# Patient Record
Sex: FEMALE | HISPANIC OR LATINO | Employment: FULL TIME | URBAN - NONMETROPOLITAN AREA
[De-identification: names, ages, dates, MRNs, and addresses within clinical notes are randomized per-mention and may not be internally consistent; named-entity substitution may affect disease eponyms.]

---

## 2022-02-07 ENCOUNTER — NON-PROVIDER VISIT (OUTPATIENT)
Dept: URGENT CARE | Facility: PHYSICIAN GROUP | Age: 57
End: 2022-02-07

## 2022-02-07 ENCOUNTER — APPOINTMENT (OUTPATIENT)
Dept: RADIOLOGY | Facility: IMAGING CENTER | Age: 57
End: 2022-02-07
Attending: FAMILY MEDICINE
Payer: COMMERCIAL

## 2022-02-07 ENCOUNTER — OCCUPATIONAL MEDICINE (OUTPATIENT)
Dept: URGENT CARE | Facility: PHYSICIAN GROUP | Age: 57
End: 2022-02-07
Payer: COMMERCIAL

## 2022-02-07 VITALS
HEIGHT: 59 IN | TEMPERATURE: 97.3 F | RESPIRATION RATE: 12 BRPM | SYSTOLIC BLOOD PRESSURE: 102 MMHG | WEIGHT: 125 LBS | HEART RATE: 72 BPM | DIASTOLIC BLOOD PRESSURE: 66 MMHG | BODY MASS INDEX: 25.2 KG/M2 | OXYGEN SATURATION: 99 %

## 2022-02-07 DIAGNOSIS — S86.912A STRAIN OF LEFT KNEE, INITIAL ENCOUNTER: ICD-10-CM

## 2022-02-07 DIAGNOSIS — Z02.1 PRE-EMPLOYMENT DRUG SCREENING: ICD-10-CM

## 2022-02-07 DIAGNOSIS — M23.92 INTERNAL DERANGEMENT OF LEFT KNEE: ICD-10-CM

## 2022-02-07 DIAGNOSIS — Z02.89 ENCOUNTER FOR PHYSICAL EXAMINATION RELATED TO EMPLOYMENT: ICD-10-CM

## 2022-02-07 LAB
AMP AMPHETAMINE: NORMAL
BAR BARBITURATES: NORMAL
BREATH ALCOHOL COMMENT: NORMAL
BZO BENZODIAZEPINES: NORMAL
COC COCAINE: NORMAL
INT CON NEG: NORMAL
INT CON POS: NORMAL
MDMA ECSTASY: NORMAL
MET METHAMPHETAMINES: NORMAL
MTD METHADONE: NORMAL
OPI OPIATES: NORMAL
OXY OXYCODONE: NORMAL
PCP PHENCYCLIDINE: NORMAL
POC BREATHALIZER: 0 PERCENT (ref 0–0.01)
POC URINE DRUG SCREEN OCDRS: YELLOW
THC: NORMAL

## 2022-02-07 PROCEDURE — 80305 DRUG TEST PRSMV DIR OPT OBS: CPT | Performed by: FAMILY MEDICINE

## 2022-02-07 PROCEDURE — 82075 ASSAY OF BREATH ETHANOL: CPT | Performed by: FAMILY MEDICINE

## 2022-02-07 PROCEDURE — 73562 X-RAY EXAM OF KNEE 3: CPT | Mod: TC,FY,LT | Performed by: RADIOLOGY

## 2022-02-07 PROCEDURE — 99203 OFFICE O/P NEW LOW 30 MIN: CPT | Performed by: FAMILY MEDICINE

## 2022-02-07 NOTE — LETTER
"EMPLOYEE’S CLAIM FOR COMPENSATION/ REPORT OF INITIAL TREATMENT  FORM C-4    EMPLOYEE’S CLAIM - PROVIDE ALL INFORMATION REQUESTED   First Name  Tara Last Name  Aaron Birthdate                    1965                Sex  female Claim Number (Insurer’s Use Only)    Home Address  [address] Age  57 y.o. Height  1.499 m (4' 11\") Weight  56.7 kg (125 lb) N     [facility] Zip  [address] Telephone  [phone] (home)    Mailing Address  [address] [facility] Zip  [address] Primary Language Spoken  Malagasy    Insurer  N/A Third-Party   [employer]   Employee's Occupation (Job Title) When Injury or Occupational Disease Occurred      Employer's Name/Company Name  [employer]  Telephone  [phone]    Office Mail Address (Number and Street)   [address]  Zip  [address]    Date of Injury  1/28/2022               Hours Injury  11:30 AM Date Employer Notified  1/28/2022 Last Day of Work after Injury     or Occupational Disease  2/7/2022 Supervisor to Whom Injury     Reported  nemo    Address or Location of Accident (if applicable)  [[address] ]   What were you doing at the time of accident? (if applicable)  moving pallets     How did this injury or occupational disease occur? (Be specific an answer in detail. Use additional sheet if necessary)  i was pushing the pallet umesh to move pallets, i felt my knee pop and my calve start to hurt    If you believe that you have an occupational disease, when did you first have knowledge of the disability and it relationship to your employment?  na  Witnesses to the Accident  tara       Nature of Injury or Occupational Disease  Workers' Compensation  Part(s) of Body Injured or Affected  Knee (L), Lower Leg (L),     I certify that the above is true and correct to the best of my knowledge and that I have provided " this information in order to obtain the benefits of [place]  I hereby authorize any physician, chiropractor, surgeon, practitioner, or other person, any hospital, including [facility] hospital, any medical service organization, any insurance company, or other institution or organization to release to each other, any medical or other information, including benefits paid or payable, pertinent to this injury or disease, except information relative to diagnosis, treatment and/or counseling for AIDS, psychological conditions, alcohol or controlled substances, for which I must give specific authorization.  A Photostat of this authorization shall be as valid as the original.     Date   Place Employee’s Original or  *Electronic Signature   THIS REPORT MUST BE COMPLETED AND MAILED WITHIN 3 WORKING DAYS OF TREATMENT   Place  [facility]  Name of Facility  [place]   Date  2/7/2022 Diagnosis and Description of Injury or Occupational Disease  (S86.912A) Strain of left knee, initial encounter  (M23.92) Internal derangement of left knee Is there evidence the injured employee was under the influence of alcohol and/or another controlled substance at the time of accident?  ? No ? Yes (if yes, please explain)    Hour  12:07 PM   Diagnoses of Strain of left knee, initial encounter and Internal derangement of left knee were pertinent to this visit. No   Treatment  Xray, OTC Motrin, knee brace and crutches and work restrictions and referral to [facility]   Have you advised the patient to remain off work five days or     more?    X-Ray Findings      ? Yes Indicate dates:   From   To      From information given by the employee, together with medical evidence, can        you directly connect this injury or occupational disease as job incurred?  Yes ? No If no, is the injured employee capable of:  ?  "full duty  No ? modified duty  Yes   Is additional medical care by a physician indicated?  Yes If Modified Duty, Specify any Limitations / Restrictions  Sedentary duty w/ standing/walking only as tolerated    Do you know of any previous injury or disease contributing to this condition or occupational disease?  ? Yes ? No (Explain if yes)                          No   Date  2/7/2022 Print Health Care Provider's   Bert Rondon M.D. I certify the employer’s copy of  this form was mailed on:   Address  [address] Insurer’s Use Only     [facility]  [address]    Provider’s Tax ID Number  [id number] Telephone  Dept: [phone]             Health Care Provider’s Original or Electronic Signature  e-SignBERT RONDON M.D. Degree (MD,DO, DC,PARuthC,APRN)         * Complete and attach Release of Information (Form C-4A) when injured employee signs C-4 Form electronically  ORIGINAL - TREATING HEALTHCARE PROVIDER PAGE 2 - INSURER/TPA PAGE 3 - EMPLOYER PAGE 4 - EMPLOYEE             Form C-4 (rev.08/21)           BRIEF DESCRIPTION OF RIGHTS AND BENEFITS  (Pursuant to [place] 616C.050)    Notice of Injury or Occupational Disease (Incident Report Form C-1): If an injury or occupational disease (OD) arises out of and in the course of employment, you must provide written notice to your employer as soon as practicable, but no later than 7 days after the accident or OD. Your employer shall maintain a sufficient supply of the required forms.    Claim for Compensation (Form C-4): If medical treatment is sought, the form C-4 is available at the place of initial treatment. A completed \"Claim for Compensation\" (Form C-4) must be filed within 90 days after an accident or OD. The treating physician or chiropractor must, within 3 working days after treatment, complete and mail to the employer, the employer's insurer and third-party , the Claim for Compensation.    Medical Treatment: If you require " medical treatment for your on-the-job injury or OD, you may be required to select a physician or chiropractor from a list provided by your workers’ compensation insurer, if it has contracted with an Organization for Managed Care (MCO) or Preferred Provider Organization (PPO) or providers of health care. If your employer has not entered into a contract with an MCO or PPO, you may select a physician or chiropractor from the Panel of Physicians and Chiropractors. Any medical costs related to your industrial injury or OD will be paid by your insurer.    Temporary Total Disability (TTD): If your doctor has certified that you are unable to work for a period of at least 5 consecutive days, or 5 cumulative days in a 20-day period, or places restrictions on you that your employer does not accommodate, you may be entitled to TTD compensation.    Temporary Partial Disability (TPD): If the wage you receive upon reemployment is less than the compensation for TTD to which you are entitled, the insurer may be required to pay you TPD compensation to make up the difference. TPD can only be paid for a maximum of 24 months.    Permanent Partial Disability (PPD): When your medical condition is stable and there is an indication of a PPD as a result of your injury or OD, within 30 days, your insurer must arrange for an evaluation by a rating physician or chiropractor to determine the degree of your PPD. The amount of your PPD award depends on the date of injury, the results of the PPD evaluation, your age and wage.    Permanent Total Disability (PTD): If you are medically certified by a treating physician or chiropractor as permanently and totally disabled and have been granted a PTD status by your insurer, you are entitled to receive monthly benefits not to exceed 66 2/3% of your average monthly wage. The amount of your PTD payments is subject to reduction if you previously received a lump-sum PPD award.    Vocational Rehabilitation  Services: You may be eligible for vocational rehabilitation services if you are unable to return to the job due to a permanent physical impairment or permanent restrictions as a result of your injury or occupational disease.    Transportation and Per Varun Reimbursement: You may be eligible for travel expenses and per varun associated with medical treatment.    Reopening: You may be able to reopen your claim if your condition worsens after claim closure.     Appeal Process: If you disagree with a written determination issued by the insurer or the insurer does not respond to your request, you may appeal to the Department of Administration, , by following the instructions contained in your determination letter. You must appeal the determination within 70 days from the date of the determination letter at [address], [address], [place], [place] [address], or [address], [place], [place] [address]. If you disagree with the  decision, you may appeal to the Department of Administration, . You must file your appeal within 30 days from the date of the  decision letter at [address], [address], [place], [place] [address], or [address], [place], [place] [address]. If you disagree with a decision of an , you may file a petition for judicial review with the District Court. You must do so within 30 days of the Appeal Officer’s decision. You may be represented by an  at your own expense or you may contact the [facility] for possible representation.    [place]  for Injured Workers (NAIW): If you disagree with a  decision, you may request that NAIW represent you without charge at an  Hearing. For information regarding denial of benefits, you may contact the [facility] at: [address], [address], [place] [address], [phone], or [address],  [address], [place] [address], [phone]    To File a Complaint with the Division: If you wish to file a complaint with the  of the Division of Industrial Relations (DIR),  please contact the Workers’ Compensation Section, [address], [address], [place], [place] [address], telephone [phone], or [address], [place], [place] [address], telephone [phone].    For assistance with Workers’ Compensation Issues: You may contact the [facility] Office for Consumer Health Assistance, [address], [place], [place] [address], Toll Free [phone], Web site: http://[facility].[place].gov/Programs/WERNER E-mail: [email or web address]              __________________________________________________________________                                    _________________            Employee Name / Signature                                                                                                                            Date                                                                                                                                                                                                                              D-2 (rev. 10/20)

## 2022-02-07 NOTE — PROGRESS NOTES
"Subjective     Alivia Walker is a 57 y.o. female who presents with Knee Pain (right knee) and Leg Pain (right calf and thigh pt states)      DOI 1/28/22  SUSAN: walking at work and felt a sudden pain to back of and side of knee. Now hurts to but weight on it or walk.      HPI    Review of Systems   Musculoskeletal: Positive for joint pain.   All other systems reviewed and are negative.             Objective     /66   Pulse 72   Temp 36.3 °C (97.3 °F)   Resp 12   Ht 1.499 m (4' 11\")   Wt 56.7 kg (125 lb)   SpO2 99%   BMI 25.25 kg/m²      Physical Exam  Vitals and nursing note reviewed.   Constitutional:       General: She is not in acute distress.     Appearance: Normal appearance. She is well-developed.   HENT:      Head: Normocephalic.   Cardiovascular:      Heart sounds: Normal heart sounds. No murmur heard.      Pulmonary:      Effort: Pulmonary effort is normal. No respiratory distress.   Musculoskeletal:        Legs:       Comments: Lt knee: + effusion, no discoloration or wounds. Some TTP medial joint line and posterior knee. Good SROM. No calf swelling/TTP   Neurological:      Mental Status: She is alert.      Motor: No abnormal muscle tone.   Psychiatric:         Mood and Affect: Mood normal.         Behavior: Behavior normal.                             Assessment & Plan       1. Strain of left knee, initial encounter  DX-KNEE 3 VIEWS LEFT    Referral to Occupational Medicine   2. Internal derangement of left knee       Xray: no acute findings by MY READ. RADIOLOGY READ PENDING.     Sedentary duty w/ standing/walking only as tolerated     Knee brace/crutches    KEANU MCPHERSON Motrin 3x/day x 5 days    Recheck [facility] in 5-7 days     Today's radiology imaging personally reviewed by me today on day of visit and Radiology readings reviewed and discussed w/ patient today.           "

## 2022-02-07 NOTE — LETTER
[facility] [address] [place] [address]  Phone:  [phone] - Fax:  [phone]   [facility] Progress Report and Disability Certification  Date of Service: 2/7/2022   No Show:  No  Date / Time of Next Visit: 2/11/2022 Jerry Stauffer   Claim Information   Patient Name: Alivia Walker  Claim Number:     Employer: [employer]  Date of Injury: 1/28/2022     Insurer / TPA: [employer]  ID / SSN:     Occupation:   Diagnosis: Diagnoses of Strain of left knee, initial encounter and Internal derangement of left knee were pertinent to this visit.    Medical Information   Related to Industrial Injury? Yes    Subjective Complaints:  DOI 1/28/22  SUSAN: walking at work and felt a sudden pain to back of and side of knee. Now hurts to but weight on it or walk.    Objective Findings:     Pre-Existing Condition(s):     Assessment:   Initial Visit    Status: Additional Care Required  Permanent Disability:No    Plan: Transfer Care    Diagnostics: X-ray    Comments:       Disability Information   Status: Released to Restricted Duty    From:  2/7/2022  Through: 2/11/2022 Restrictions are: Temporary   Physical Restrictions   Sitting:    Standing:    Stooping:    Bending:      Squatting:    Walking:    Climbing:    Pushing:      Pulling:    Other:    Reaching Above Shoulder (L):   Reaching Above Shoulder (R):       Reaching Below Shoulder (L):    Reaching Below Shoulder (R):      Not to exceed Weight Limits   Carrying(hrs):   Weight Limit(lb):   Lifting(hrs):   Weight  Limit(lb):     Comments: ** Sedentary duty w/ standing/walking only as tolerated **    Repetitive Actions   Hands: i.e. Fine Manipulations from Grasping:     Feet: i.e. Operating Foot Controls:     Driving / Operate Machinery:     Health Care Provider’s Original or Electronic Signature  Bert Colvin M.D. Health Care Provider’s Original or Electronic Signature    Christiano Sharif MD          Clinic Name / Location: [facility]  [address] [place] [address]  [place] [address] Clinic Phone Number: Dept: [phone]   Appointment Time: 10:50 Am Visit Start Time: 12:07 PM   Check-In Time:  11:00 Am Visit Discharge Time: 1:23 PM   Original-Treating Physician or Chiropractor    Page 2-Insurer/TPA    Page 3-Employer    Page 4-Employee

## 2022-02-11 ENCOUNTER — OCCUPATIONAL MEDICINE (OUTPATIENT)
Dept: OCCUPATIONAL MEDICINE | Facility: CLINIC | Age: 57
End: 2022-02-11
Payer: COMMERCIAL

## 2022-02-11 VITALS
WEIGHT: 125 LBS | HEIGHT: 59 IN | HEART RATE: 81 BPM | RESPIRATION RATE: 14 BRPM | BODY MASS INDEX: 25.2 KG/M2 | TEMPERATURE: 98.4 F

## 2022-02-11 DIAGNOSIS — S83.92XD SPRAIN OF LEFT KNEE, UNSPECIFIED LIGAMENT, SUBSEQUENT ENCOUNTER: ICD-10-CM

## 2022-02-11 PROCEDURE — 99203 OFFICE O/P NEW LOW 30 MIN: CPT | Performed by: PREVENTIVE MEDICINE

## 2022-02-11 ASSESSMENT — PAIN SCALES - GENERAL: PAINLEVEL: 4=SLIGHT-MODERATE PAIN

## 2022-02-11 NOTE — LETTER
[address],   [address] [place] [address]  Phone:  [phone]- Fax:  [phone]   Occupational Health [facility] Progress Report and Disability Certification  Date of Service: 2/11/2022   No Show:  No  Date / Time of Next Visit: 3/4/2022 @ 2:30 AM    Claim Information   Patient Name: Alivia Walker  Claim Number:     Employer: [employer]  Date of Injury: 1/28/2022     Insurer / TPA: [employer]  ID / SSN:     Occupation:   Diagnosis: The encounter diagnosis was Sprain of left knee, unspecified ligament, subsequent encounter.    Medical Information   Related to Industrial Injury? Yes    Subjective Complaints:  DOI 1/28/2022: 57-year-old injured worker presents with left knee injury.  SUSAN: She was pushing a pallet umesh, went to turn with a loaded pallet umesh and felt sharp pain in the left knee and a pop.  She believes she likely twisted it when she was turning.  She was seen in [place] urgent care after symptoms did not improve the first week.  X-rays of the left knee showed some mild arthritis.  Patient states pain is mostly over the medial knee.  Pain with any standing or walking.  She is able to bear some weight but is still using crutches.  She is also using the knee brace for relief.  She has not returned to work yet.  She denies any history of knee injury.  He does note a little bit of instability while walking.   Objective Findings: Left knee: No gross deformity.  Diffuse tenderness over the medial knee including the medial joint line.  Full range of motion with pain in knee flexion/extension.  Anterior/posterior drawer test negative.  No laxity varus or valgus stress but does elicit some pain in the medial knee.  Trevor's indeterminate.   Pre-Existing Condition(s):     Assessment:   Condition Same    Status: Additional Care Required  Permanent Disability:No    Plan:      Diagnostics:      Comments:  Will recommend conservative  management at this time  Place referral for physical therapy  Recommend ibuprofen 800 mg 3 times daily as needed, may also take   okay to use heat and/or ice as needed  Okay to use OTC muscle creams/ointments as needed  Co  ntinue hinged knee brace  Gradually wean off crutches as tolerated  Restricted duty  Follow-up 3 weeks    Disability Information   Status: Released to Restricted Duty    From:  2022  Through: 3/4/2022 Restrictions are: Temporary   Physical Restrictions   Sitting:    Standing:  < or = to 2 hrs/day Stooping:    Bending:      Squattin hrs/day Walking:  < or = to 1 hr/day Climbin hrs/day Pushing:      Pulling:    Other:    Reaching Above Shoulder (L):   Reaching Above Shoulder (R):       Reaching Below Shoulder (L):    Reaching Below Shoulder (R):      Not to exceed Weight Limits   Carrying(hrs):   Weight Limit(lb): < or = to 10 pounds Lifting(hrs):   Weight  Limit(lb): < or = to 10 pounds   Comments: Sedentary work only.  Allow to use crutches as needed    Repetitive Actions   Hands: i.e. Fine Manipulations from Grasping:     Feet: i.e. Operating Foot Controls:     Driving / Operate Machinery:     Health Care Provider’s Original or Electronic Signature  Bart Noriega D.O. Health Care Provider’s Original or Electronic Signature    Christiano Sharif MD         Clinic Name / Location: [address],   [address] [place] [address] Clinic Phone Number: Dept: [phone]   Appointment Time: 7:45 Am Visit Start Time: 8:10 AM   Check-In Time:  8:06 Am Visit Discharge Time:  8:40 AM    Original-Treating Physician or Chiropractor    Page 2-Insurer/TPA    Page 3-Employer    Page 4-Employee

## 2022-02-11 NOTE — PROGRESS NOTES
"Subjective:     Alivia Walker is a 57 y.o. female who presents for Follow-Up (DOI 1/28/22 back of and side of knee - BETTER - rm 16)      DOI 1/28/2022: 57-year-old injured worker presents with left knee injury.  SUSAN: She was pushing a pallet umesh, went to turn with a loaded pallet umesh and felt sharp pain in the left knee and a pop.  She believes she likely twisted it when she was turning.  She was seen in [place] urgent care after symptoms did not improve the first week.  X-rays of the left knee showed some mild arthritis.  Patient states pain is mostly over the medial knee.  Pain with any standing or walking.  She is able to bear some weight but is still using crutches.  She is also using the knee brace for relief.  She has not returned to work yet.  She denies any history of knee injury.  He does note a little bit of instability while walking.    ROS: All systems were reviewed on intake form, form was reviewed and signed. See scanned documents in media. Pertinent positives and negatives included in HPI.    PMH: No pertinent past medical history to this problem  MEDS: Medications were reviewed in Epic  ALLERGIES: No Known Allergies  SOCHX: Works as a  at [employer]  FH: No pertinent family history to this problem       Objective:     Pulse 81   Temp 36.9 °C (98.4 °F) (Temporal)   Resp 14   Ht 1.499 m (4' 11\")   Wt 56.7 kg (125 lb)   BMI 25.25 kg/m²     Constitutional: Patient is in no acute distress. Appears well-developed and well-nourished.   HENT: Normocephalic and atraumatic. EOM are normal. No scleral icterus.   Cardiovascular: Normal rate.    Pulmonary/Chest: Effort normal. No respiratory distress.   Neurological: Patient is alert and oriented to person, place, and time.   Skin: Skin is warm and dry.   Psychiatric: Normal mood and affect. Behavior is normal.     Left knee: No gross deformity.  Diffuse tenderness over the medial knee including the medial joint line.  Full range of motion with " pain in knee flexion/extension.  Anterior/posterior drawer test negative.  No laxity varus or valgus stress but does elicit some pain in the medial knee.  Trevor's indeterminate.    Assessment/Plan:       1. Sprain of left knee, unspecified ligament, subsequent encounter  - Referral to Physical Therapy    Released to Restricted Duty FROM 2/11/2022 TO 3/4/2022  Sedentary work only.  Allow to use crutches as needed  Will recommend conservative management at this time  Place referral for physical therapy  Recommend ibuprofen 800 mg 3 times daily as needed, may also take   okay to use heat and/or ice as needed  Okay to use OTC muscle creams/ointments as needed  Co  ntinue hinged knee brace  Gradually wean off crutches as tolerated  Restricted duty  Follow-up 3 weeks    Differential diagnosis, natural history, supportive care, and indications for immediate follow-up discussed.    Approximately 35 minutes were spent in reviewing notes, preparing for visit, obtaining history, exam and evaluation, patient counseling/education and post visit documentation/orders.

## 2022-03-07 ENCOUNTER — OFFICE VISIT (OUTPATIENT)
Dept: URGENT CARE | Facility: PHYSICIAN GROUP | Age: 57
End: 2022-03-07

## 2022-03-07 VITALS
DIASTOLIC BLOOD PRESSURE: 62 MMHG | SYSTOLIC BLOOD PRESSURE: 108 MMHG | WEIGHT: 125 LBS | TEMPERATURE: 97.1 F | OXYGEN SATURATION: 98 % | HEIGHT: 62 IN | RESPIRATION RATE: 14 BRPM | BODY MASS INDEX: 23 KG/M2 | HEART RATE: 82 BPM

## 2022-03-07 DIAGNOSIS — S86.912D STRAIN OF LEFT KNEE, SUBSEQUENT ENCOUNTER: ICD-10-CM

## 2022-03-07 DIAGNOSIS — Z76.89 RETURN TO WORK EVALUATION: ICD-10-CM

## 2022-03-07 PROCEDURE — 99212 OFFICE O/P EST SF 10 MIN: CPT | Performed by: NURSE PRACTITIONER

## 2022-03-07 ASSESSMENT — ENCOUNTER SYMPTOMS
FEVER: 0
FALLS: 0
VOMITING: 0
MYALGIAS: 0
FOCAL WEAKNESS: 0
CHILLS: 0
NAUSEA: 0

## 2022-03-07 NOTE — PROGRESS NOTES
Subjective:     Alivia Walker is a 57 y.o. female who presents for No chief complaint on file.      HPI  Pt presents for evaluation of an existing work comp injury.   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  SUSAN: She was pushing a pallet umesh, went to turn with a loaded pallet umesh and felt sharp pain in the left knee and a pop.  She believes she likely twisted it when she was turning.  She was seen in [place] urgent [facility] after symptoms did not improve the first week.  X-rays of the left knee showed some mild arthritis.  Patient states pain is mostly over the medial knee.  Pain with any standing or walking.  She is able to bear some weight but is still using crutches.  She is also using the knee brace for relief.  She has not returned to work yet.  She denies any history of knee injury.  She does note a little bit of instability while walking.  Update 3/7/2022.  She states that her left-sided knee pain has completely resolved.  She has no issues with ambulation, bending or extending left leg.  She is ready to return to work full max duty without restrictions.  Review of Systems   Constitutional: Negative for chills and fever.   Gastrointestinal: Negative for nausea and vomiting.   Musculoskeletal: Negative for falls, joint pain and myalgias.   Neurological: Negative for focal weakness.       PMH: No past medical history on file.  ALLERGIES: No Known Allergies  SURGHX: No past surgical history on file.  SOCHX:   Social History     Socioeconomic History   • Marital status: Unknown   Tobacco Use   • Smoking status: Never Smoker   • Smokeless tobacco: Never Used   Substance and Sexual Activity   • Alcohol use: Yes   • Drug use: Never     FH: No family history on file.      Objective:   There were no vitals taken for this visit.    Physical Exam  Vitals and nursing note reviewed.   Constitutional:       General: She is not in acute distress.     Appearance: Normal appearance. She is not  ill-appearing.   HENT:      Head: Normocephalic and atraumatic.      Right Ear: External ear normal.      Left Ear: External ear normal.      Nose: No congestion or rhinorrhea.      Mouth/Throat:      Mouth: Mucous membranes are moist.   Eyes:      Extraocular Movements: Extraocular movements intact.      Pupils: Pupils are equal, round, and reactive to light.   Cardiovascular:      Rate and Rhythm: Normal rate and regular rhythm.      Pulses: Normal pulses.      Heart sounds: Normal heart sounds.   Pulmonary:      Effort: Pulmonary effort is normal.      Breath sounds: Normal breath sounds.   Abdominal:      General: Abdomen is flat. Bowel sounds are normal.      Palpations: Abdomen is soft.   Musculoskeletal:         General: Normal range of motion.      Cervical back: Normal range of motion and neck supple.      Left knee: No swelling, deformity, effusion, erythema or bony tenderness. Normal range of motion. No tenderness.   Skin:     General: Skin is warm and dry.      Capillary Refill: Capillary refill takes less than 2 seconds.   Neurological:      General: No focal deficit present.      Mental Status: She is alert and oriented to person, place, and time. Mental status is at baseline.   Psychiatric:         Mood and Affect: Mood normal.         Behavior: Behavior normal.         Thought Content: Thought content normal.         Judgment: Judgment normal.         Assessment/Plan:   Assessment    1. Return to work evaluation     2. Strain of left knee, subsequent encounter       Pt cleared to return to work without restrictions.   AVS handout given and reviewed with patient. Pt educated on red flags and when to seek treatment back in ER or UC.

## 2022-03-07 NOTE — LETTER
[facility] [address] [place] [address]  Phone:  [phone] - Fax:  [phone]   [facility] Progress Report and Disability Certification  Date of Service: 3/7/2022   No Show:  No  Date / Time of Next Visit:     Claim Information   Patient Name: Alivia Walker  Claim Number:     Employer: [employer]  Date of Injury:      Insurer / TPA:    ID / SSN:     Occupation:   Diagnosis: Diagnoses of Return to work evaluation and Strain of left knee, subsequent encounter were pertinent to this visit.    Medical Information   Related to Industrial Injury? Yes    Subjective Complaints:  Pt presents for evaluation of an existing work comp injury.   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  SUSAN: She was pushing a pallet umesh, went to turn with a loaded pallet umesh and felt sharp pain in the left knee and a pop.  She believes she likely twisted it when she was turning.  She was seen in [place] urgent [facility] after symptoms did not improve the first week.  X-rays of the left knee showed some mild arthritis.  Patient states pain is mostly over the medial knee.  Pain with any standing or walking.  She is able to bear some weight but is still using crutches.  She is also using the knee brace for relief.  She has not returned to work yet.  She denies any history of knee injury.  She does note a little bit of instability while walking.  Update 3/7/2022.  She states that her left-sided knee pain has completely resolved.  She has no issues with ambulation, bending or extending left leg.  She is ready to return to work full max duty without restrictions.   Objective Findings: Left knee: No swelling, deformity, effusion, erythema or bony tenderness. Normal range of motion. No tenderness.      Pre-Existing Condition(s):     Assessment:   Condition Improved    Status: Discharged /  MMI  Permanent Disability:No    Plan:      Diagnostics:       Comments:       Disability Information   Status: Released to Full Duty    From:     Through:   Restrictions are:     Physical Restrictions   Sitting:    Standing:    Stooping:    Bending:      Squatting:    Walking:    Climbing:    Pushing:      Pulling:    Other:    Reaching Above Shoulder (L):   Reaching Above Shoulder (R):       Reaching Below Shoulder (L):    Reaching Below Shoulder (R):      Not to exceed Weight Limits   Carrying(hrs):   Weight Limit(lb):   Lifting(hrs):   Weight  Limit(lb):     Comments: Return for worsening symptoms.     Repetitive Actions   Hands: i.e. Fine Manipulations from Grasping:     Feet: i.e. Operating Foot Controls:     Driving / Operate Machinery:     Health Care Provider’s Original or Electronic Signature  [facility] Provider’s Original or Electronic Signature    Christiano Sharif MD         Clinic Name / Location: [address] [address] Clinic Phone Number: Dept: [phone]   Appointment Time: 11:00 Am Visit Start Time: 12:11 PM   Check-In Time:  1:48 Pm Visit Discharge Time: 2:40 PM   Original-Treating Physician or Chiropractor    Page 2-Insurer/TPA    Page 3-Employer    Page 4-Employee